# Patient Record
Sex: FEMALE | Race: OTHER | HISPANIC OR LATINO | URBAN - METROPOLITAN AREA
[De-identification: names, ages, dates, MRNs, and addresses within clinical notes are randomized per-mention and may not be internally consistent; named-entity substitution may affect disease eponyms.]

---

## 2024-07-23 ENCOUNTER — EMERGENCY (EMERGENCY)
Facility: HOSPITAL | Age: 34
LOS: 1 days | Discharge: HOME | End: 2024-07-23
Attending: EMERGENCY MEDICINE | Admitting: EMERGENCY MEDICINE
Payer: COMMERCIAL

## 2024-07-23 VITALS
SYSTOLIC BLOOD PRESSURE: 145 MMHG | HEART RATE: 89 BPM | TEMPERATURE: 98 F | RESPIRATION RATE: 16 BRPM | OXYGEN SATURATION: 99 % | DIASTOLIC BLOOD PRESSURE: 82 MMHG

## 2024-07-23 VITALS
HEIGHT: 55 IN | DIASTOLIC BLOOD PRESSURE: 81 MMHG | TEMPERATURE: 99 F | WEIGHT: 122.58 LBS | RESPIRATION RATE: 16 BRPM | SYSTOLIC BLOOD PRESSURE: 122 MMHG | OXYGEN SATURATION: 99 % | HEART RATE: 95 BPM

## 2024-07-23 LAB — HCG UR QL: POSITIVE

## 2024-07-23 PROCEDURE — 71046 X-RAY EXAM CHEST 2 VIEWS: CPT

## 2024-07-23 PROCEDURE — 71046 X-RAY EXAM CHEST 2 VIEWS: CPT | Mod: 26

## 2024-07-23 PROCEDURE — 99284 EMERGENCY DEPT VISIT MOD MDM: CPT

## 2024-07-23 PROCEDURE — 99283 EMERGENCY DEPT VISIT LOW MDM: CPT | Mod: 25

## 2024-07-23 PROCEDURE — 81025 URINE PREGNANCY TEST: CPT

## 2024-07-23 RX ORDER — ACETAMINOPHEN 325 MG
1000 TABLET ORAL ONCE
Refills: 0 | Status: COMPLETED | OUTPATIENT
Start: 2024-07-23 | End: 2024-07-23

## 2024-07-23 RX ORDER — DIAZEPAM 10 MG/1
5 TABLET ORAL ONCE
Refills: 0 | Status: DISCONTINUED | OUTPATIENT
Start: 2024-07-23 | End: 2024-07-24

## 2024-07-23 RX ORDER — LIDOCAINE HCL 28 MG/G
1 GEL TOPICAL ONCE
Refills: 0 | Status: COMPLETED | OUTPATIENT
Start: 2024-07-23 | End: 2024-07-23

## 2024-07-23 RX ORDER — KETOROLAC TROMETHAMINE 30 MG/ML
30 INJECTION, SOLUTION INTRAMUSCULAR ONCE
Refills: 0 | Status: DISCONTINUED | OUTPATIENT
Start: 2024-07-23 | End: 2024-07-24

## 2024-07-23 RX ADMIN — LIDOCAINE HCL 1 PATCH: 28 GEL TOPICAL at 22:21

## 2024-07-23 RX ADMIN — Medication 1000 MILLIGRAM(S): at 22:30

## 2024-07-23 RX ADMIN — Medication 1000 MILLIGRAM(S): at 23:30

## 2024-07-23 NOTE — ED PROVIDER NOTE - PATIENT PORTAL LINK FT
You can access the FollowMyHealth Patient Portal offered by [facility] by registering at the following website: [email or web address]. By joining [employer]’s FollowMyHealth portal, you will also be able to view your health information using other applications (apps) compatible with our system.

## 2024-07-23 NOTE — ED PROVIDER NOTE - CONSTITUTIONAL, MLM
normal... Well appearing, awake, alert, oriented to person, place, time/situation and in no apparent distress. Uncomfortable with movement

## 2024-07-23 NOTE — ED ADULT NURSE NOTE - CHIEF COMPLAINT QUOTE
I have left side back pain from my neck to my lower back since 5pm and I have fever; pt took ibuprofen 400mg at 1930 with no relief

## 2024-07-23 NOTE — ED ADULT TRIAGE NOTE - CHIEF COMPLAINT QUOTE
I have left side back pain from my neck to my lower back since 5pm and I have fever; pt took ibuprofen 400mg at 1930 with no relief Lymphatic malformation

## 2024-07-23 NOTE — ED ADULT TRIAGE NOTE - TEMP AT ED ARRIVAL (C)
37.2 Erythromycin Pregnancy And Lactation Text: This medication is Pregnancy Category B and is considered safe during pregnancy. It is also excreted in breast milk.

## 2024-07-23 NOTE — ED ADULT NURSE NOTE - NSFALLUNIVINTERV_ED_ALL_ED
Bed/Stretcher in lowest position, wheels locked, appropriate side rails in place/Call bell, personal items and telephone in reach/Instruct patient to call for assistance before getting out of bed/chair/stretcher/Non-slip footwear applied when patient is off stretcher/[place] to call system/Physically safe environment - no spills, clutter or unnecessary equipment/Purposeful proactive rounding/Room/bathroom lighting operational, light cord in reach

## 2024-07-23 NOTE — ED ADULT NURSE NOTE - PLAN OF CARE
EMS Transport Request  For use at [facility], [place], [place], and [place] only   Patient Name: Mitzi Eric : 1936   Weight:50.4 kg (111 lb 3.2 oz) Pick-up Location: [facility] BLS/ALS: bls   Insurance: [employer] MEDICARE REPLACEMENT Auth End Date: 2023   Pre-Cert #: yes D/C Summary complete: no   Destination: [place]   Contact Precautions: none   Equipment (O2, Fluids, etc.): 3 liters   Arrive By Date/Time: 2023 Stretcher/WC: stretcher   CM Requesting: YURI Joseph Ext: [id number]   Notes/Medical Necessity: weakness  dementia weakness     ______________________________________________________________________    *Only 2 patient bags OR 1 carry-on size bag are permitted.  Wheelchairs and walkers CANNOT transported with the patient. Acknowledge: yes    
Call bell/Explanation of exam/test/Position of comfort

## 2024-07-23 NOTE — ED PROVIDER NOTE - OBJECTIVE STATEMENT
Patient presents with complaints of pain in her left upper back radiating down to her left mid back.  Patient states the pain started a few hours ago while going to lie down.  Patient take 2 ibuprofen approximately 2 and half hours ago.  Patient felt warm but did not check her temperature.  No cough.  No difficulty breathing.  Pain worse with movement.  Worse when she raises her left arm or turns her body.  No sore throat or runny nose.  No abdominal pain.  No vomiting or diarrhea.  No urinary complaints.

## 2024-07-23 NOTE — ED PROVIDER NOTE - CLINICAL SUMMARY MEDICAL DECISION MAKING FREE TEXT BOX
Patient with back pain and upper back that started while going to lie down.  Pain suggestive of musculoskeletal origin.  Will treat pain and get chest x-ray.  If patient improves will refer to Ortho.

## 2024-07-23 NOTE — ED PROVIDER NOTE - CARE PROVIDER_API CALL
Maria D Quintero  Obstetrics and Gynecology  [address] [place] [id number]  Phone: [phone]  Fax: [phone]  Follow Up Time: 1-3 Days

## 2024-07-23 NOTE — ED ADULT NURSE NOTE - OBJECTIVE STATEMENT
34yr old female walked into ED c/o left side posterior neck pain radiating down left side of back; denies urinary symptoms denies injury; pain increases with movement 34yr old female walked into ED c/o left side posterior neck pain radiating down left side of back; denies urinary symptoms denies injury; pain increases with movement; LMP 06/18/2024

## 2024-07-23 NOTE — ED PROVIDER NOTE - NSFOLLOWUPINSTRUCTIONS_ED_ALL_ED_FT
Dolor muscular en los adultos  Muscle Pain, Adult  El dolor muscular, también llamado mialgia, es bladimir afección que causa dolor en dae o más músculos del cuerpo. El dolor puede ser leve, moderado o intenso. Puede ser jeet, bladimir molestia continua o bladimir sensación de ardor. La mayoría de las veces, el dolor dura solo un corto período y desaparece por sí solo.    Es normal sentir algo de dolor muscular después de comenzar un nuevo programa de ejercicios. Los músculos que no se guerra usado mucho dolerán al principio.    ¿Cuáles son las causas?  Puede tener dolor muscular cuando usa los músculos de bladimir manera nueva o diferente después de no haberlos usado adrian algún tiempo. El dolor muscular también puede deberse al uso excesivo o al estiramiento de un músculo más allá de menjivar longitud normal (distensión muscular). Es más probable que tenga dolor muscular si no está en forma.    Algunas otras causas son las siguientes:  Lesiones o moretones.  Enfermedades infecciosas. Estas incluyen enfermedades causadas por virus, maricruz la gripe (influenza).  Fibromialgia. Es bladimir afección a taryn plazo (crónica) que causa sensibilidad muscular, cansancio (fatiga) y dolor de chris.  Enfermedades autoinmunes o reumatológicas. Son afecciones, maricruz el lupus, que hacen que el sistema de defensa del cuerpo (sistema inmunitario) ataque zonas del cuerpo.  Ciertos medicamentos. Estos incluyen los inhibidores de la enzima convertidora de angiotensina (IECA) y estatinas.  ¿Cuáles son los signos o síntomas?  El síntoma principal son los músculos inflamados o doloridos. Los músculos pueden estar doloridos cuando usted realiza actividades y cuando se estira. También puede duke bladimir leve hinchazón.    ¿Cómo se diagnostica?  El dolor muscular se diagnostica mediante un examen físico. El médico le hará preguntas acerca del dolor y cuándo comenzó a sentirlo.  Si no ha tenido dolor muscular adrian mucho tiempo, el médico puede esperar antes de hacer diversas pruebas.  Si el dolor muscular ha durado mucho tiempo, se pueden realizar pruebas de inmediato.  En algunos casos, es posible que necesite pruebas para descartar otras afecciones y enfermedades.  ¿Cómo se trata?  El tratamiento del dolor muscular depende de la causa. El cuidado en casa a menudo es suficiente para aliviar el dolor. El médico también puede recetarle antiinflamatorios no esteroideos (DK), maricruz ibuprofeno.    Siga estas instrucciones en menjivar casa:  Medicamentos    Use los medicamentos de venta jessica y los recetados solamente maricruz se lo haya indicado el médico.  Pregúntele al médico si el medicamento recetado le impide conducir o usar maquinaria.  Manejo del dolor, la hinchazón y las molestias    Bag of ice on a towel on the skin.  A heating pad for use on the affected area.  Si se lo indican, aplique hielo en la michelle dolorida adrian los primeros 2 días de dolor.  Ponga el hielo en bladimir bolsa plástica.  Coloque bladimir toalla entre la piel y la bolsa.  Aplique el hielo adrian 20 minutos, 2 a 3 veces por día.  Si la piel se le pone de color diaz brillante, retire el hielo de inmediato para evitar daños en la piel. El riesgo de daño es mayor si no puede sentir dolor, calor o frío.  Adrian los primeros 2 días de dolor muscular, o si hay hinchazón:  No se dé domingo calientes.  No use el jacuzzi, baño khai, sauna, almohadillas térmicas ni ninguna otra axel de calor.  Después de 2 o 3 días, puede cambiar entre aplicar hielo y calor en la michelle. Si se lo indican, aplique calor en la michelle afectada con la frecuencia que le haya dicho el médico. Use la axel de calor que el médico le recomiende, maricruz bladimir compresa de calor húmedo o bladimir almohadilla térmica.  Coloque bladimir toalla entre la piel y la axel de calor.  Aplique calor adrian 20 a 30 minutos.  Si la piel se le pone de color diaz brillante, retire el hielo o el calor de inmediato para evitar daños en la piel. El riesgo de daño es mayor si no puede sentir dolor, calor o frío.  Si tiene bladimir lesión, levante (eleve) la michelle lesionada por encima del nivel del corazón mientras esté sentado o acostado.  Actividad    A person standing with arms stretched straight out in front and then squatting while keeping the knees over the toes.  Si el dolor muscular se debe al uso excesivo, tracy lo siguiente:  Disminuya steffanie actividades hasta que el dolor desaparezca.  Realice ejercicios suaves y regulares si normalmente no hace actividad física.  Entre en calor antes de hacer ejercicio. Elongue antes y después de hacer el ejercicio. Gutierrez puede ayudar a disminuir el riesgo de que sufra dolor muscular.  No continúe haciendo actividad física si el dolor es intenso. El dolor intenso podría indicar que se ha lesionado un músculo.  Es posible que deba evitar levantar objetos. Pregúntele al médico cuánto peso puede levantar sin correr riesgos.  Retome steffanie actividades normales maricruz se lo haya indicado el médico. Pregúntele al médico qué actividades son seguras para usted.  Instrucciones generales    No consuma ningún producto que contenga nicotina o tabaco. Estos productos incluyen cigarrillos, tabaco para mascar y aparatos de vapeo, maricruz los cigarrillos electrónicos. Si necesita ayuda para dejar de fumar, consulte al médico.  Comuníquese con un médico si:  El dolor muscular empeora y los medicamentos no surten efecto.  El dolor muscular dura más de 3 días.  Tiene bladimir erupción cutánea o fiebre.  Tiene dolor muscular después de bladimir picadura de garrapata.  Tiene dolor muscular al hacer ejercicio, aunque esté en buena forma.  Tiene enrojecimiento dolor o hinchazón.  Tiene dolor muscular después de comenzar un medicamento nuevo o de cambiar la dosis de un medicamento.  Solicite ayuda de inmediato si:  Tiene dificultad para respirar.  Tiene dificultad para tragar.  Tiene dolor muscular junto con rigidez en el efra, fiebre y vómitos.  Tiene debilidad muscular intensa o no puede  bladimir parte del cuerpo.  Orina menos o tiene orina oscura, con sammy o de color diferente.  Tiene enrojecimiento o hinchazón en el lugar del dolor muscular.  Estos síntomas pueden indicar bladimir emergencia. Solicite ayuda de inmediato. Llame al 911.  No espere a silvia si los síntomas desaparecen.  No conduzca por steffanie propios medios hasta el hospital.  Esta información no tiene maricruz fin reemplazar el consejo del médico. Asegúrese de hacerle al médico cualquier pregunta que tenga.

## 2024-08-27 PROBLEM — Z78.9 OTHER SPECIFIED HEALTH STATUS: Chronic | Status: ACTIVE | Noted: 2024-07-23

## 2024-11-03 ENCOUNTER — NON-APPOINTMENT (OUTPATIENT)
Age: 34
End: 2024-11-03

## 2024-11-06 PROBLEM — Z00.00 ENCOUNTER FOR PREVENTIVE HEALTH EXAMINATION: Status: ACTIVE | Noted: 2024-11-06

## 2024-11-08 ENCOUNTER — APPOINTMENT (OUTPATIENT)
Dept: ORTHOPEDIC SURGERY | Facility: CLINIC | Age: 34
End: 2024-11-08

## 2024-12-08 ENCOUNTER — EMERGENCY (EMERGENCY)
Facility: HOSPITAL | Age: 34
LOS: 1 days | Discharge: HOME | End: 2024-12-08
Attending: EMERGENCY MEDICINE | Admitting: EMERGENCY MEDICINE
Payer: COMMERCIAL

## 2024-12-08 VITALS
TEMPERATURE: 98 F | RESPIRATION RATE: 20 BRPM | SYSTOLIC BLOOD PRESSURE: 118 MMHG | HEIGHT: 55 IN | WEIGHT: 127.87 LBS | HEART RATE: 119 BPM | OXYGEN SATURATION: 98 % | DIASTOLIC BLOOD PRESSURE: 75 MMHG

## 2024-12-08 PROCEDURE — 99283 EMERGENCY DEPT VISIT LOW MDM: CPT

## 2024-12-08 PROCEDURE — 99284 EMERGENCY DEPT VISIT MOD MDM: CPT | Mod: 25

## 2024-12-08 RX ORDER — ACETAMINOPHEN 500MG 500 MG/1
650 TABLET, COATED ORAL ONCE
Refills: 0 | Status: COMPLETED | OUTPATIENT
Start: 2024-12-08 | End: 2024-12-08

## 2024-12-08 RX ORDER — ONDANSETRON HYDROCHLORIDE 4 MG/1
4 TABLET, FILM COATED ORAL ONCE
Refills: 0 | Status: COMPLETED | OUTPATIENT
Start: 2024-12-08 | End: 2024-12-08

## 2024-12-08 RX ADMIN — ONDANSETRON HYDROCHLORIDE 4 MILLIGRAM(S): 4 TABLET, FILM COATED ORAL at 23:09

## 2024-12-08 RX ADMIN — ACETAMINOPHEN 500MG 650 MILLIGRAM(S): 500 TABLET, COATED ORAL at 23:08

## 2024-12-08 NOTE — ED PROVIDER NOTE - OBJECTIVE STATEMENT
34y F c/o n/v/d, pt is 20+ wks pregnant (unsure of dates), reports having diarrhea and vomiting, multiple episodes, started last night, neice with the same, pt also has some left sided neck pain, no fever/chills, hasn't taken anything for nausea, took tylenol yesterday once

## 2024-12-08 NOTE — ED PROVIDER NOTE - PROVIDER TOKENS
FREE:[LAST:[Follow up with your obstetrician],PHONE:[(   )    -],FAX:[(   )    -],FOLLOWUP:[1-3 Days]]

## 2024-12-08 NOTE — ED PROVIDER NOTE - PATIENT PORTAL LINK FT
You can access the FollowMyHealth Patient Portal offered by [facility] by registering at the following website: [email or web address]. By joining [employer]’s FollowMyHealth portal, you will also be able to view your health information using other applications (apps) compatible with our system.

## 2024-12-08 NOTE — ED PROVIDER NOTE - NSFOLLOWUPINSTRUCTIONS_ED_ALL_ED_FT
Gastroenteritis    LO QUE NECESITA SABER:    La gastroenteritis, o gripe estomacal, es bladimir infección del estómago y los intestinos. Las causas pueden ser bacterias, parásitos, virus, sustancias químicas o reacciones a medicamentos.  Tracto digestivo    INSTRUCCIONES SOBRE EL MOISES HOSPITALARIA:    Busque atención médica de inmediato si:    Ve sammy en menjivar vómito o diarrea.    Usted no puede dejar de vomitar.    Usted tiene dolor estomacal grave o menjivar abdomen está hinchado.    Usted no ha orinado en 12 horas.    Usted siente que se va a desmayar.  Llame a menjivar médico si:    Usted tiene fiebre que no desaparece.    Usted continúa con vómitos o diarrea aún después del tratamiento.    Usted ve lombrices en menjivar diarrea.    Menjivar boca u ojos están secos. Usted no está orinando tanto o con la misma frecuencia.    Usted tiene preguntas o inquietudes acerca de menjivar condición o cuidado.  Medicamentos:    Los medicamentosse pueden administrar para detener los vómitos o la diarrea, disminuir los calambres abdominales o tratar bladimir infección.    [place] steffanie medicamentos maricruz se le haya indicado.Consulte con menjivar médico si usted shayan que menjivar medicamento no le está ayudando o si presenta efectos secundarios. Infórmele al médico si usted es alérgico a algún medicamento. Mantenga bladimir lista actualizada de los medicamentos, las vitaminas y los productos herbales que peter. Incluya los siguientes datos de los medicamentos: cantidad, frecuencia y motivo de administración. Traiga con usted la lista o los envases de las píldoras a steffanie citas de seguimiento. Lleve la lista de los medicamentos con usted en cristy de bladimir emergencia.  El manejo de steffanie síntomas:    [place] líquidos maricruz se le haya indicado.Pregunte a menjivar médico qué cantidad de líquido debe beber a diario y qué líquidos le recomienda. Es posible que también necesite jagdish bladimir solución de rehidratación oral (SRO). Bladimir SRO contiene la cantidad adecuada de azúcar, sal y minerales en agua para reponer los líquidos corporales.    Consuma alimentos blandos.Cuando usted sienta hambre, empiece a comer alimentos suaves y blandos. Ejemplos son los plátanos, sopas claras, tex y puré de manzana. No consuma productos lácteos, alcohol, bebidas azucaradas, o bebidas con cafeína hasta que se sienta mejor. Evite comer comidas rápidas o ricas en grasas.    Coma comidas pequeñas frecuentemente michael el día.Menjivar estómago puede tolerar comidas pequeñas cada 2 o 3 horas en lugar de 3 comidas grandes.    Descanse el mayor tiempo posible.Cuando se empiece a sentir mejor, comience poco a poco a hacer más cada día.  Evite la propagación de la gastroenteritis:La gastroenteritis se puede propagar fácilmente. Manténgase usted, menjivar jannie y steffanie alrededores limpios para ayudar a evitar la propagación de la gastroenteritis:    Lávese las kely frecuentemente.Utilice agua y jabón. Lávese las kely después de usar el baño, cambiarle el pañal a un kim o estornudar. Lávese las kely antes de comer o preparar alimentos.  Lavado de kely      Limpie las superficies y lave la ropa con frecuencia.Lave menjivar ropa y steffanie toallas por separado del skye de la ropa. Limpie las superficies de menjivar hogar con limpiador antibacterial o con blanqueador.    Lave y cocine swapna los alimentos.Lave las verduras crudas antes de cocinar. Cocine swapna las brisa, pescados y huevos. No utilice los mismos platos para las brisa crudas que para otros alimentos. Ponga en el refrigerador inmediatamente cualquier alimento que haya sobrado.    Esté alerta cuando usted vaya de campamento o cuando viaje.Solamente tome agua limpia. No tome agua de los toro o humera a menos que usted purifique o hierva el agua norma. Cuando viaje, tome agua embotellada y no le ponga hielo. No coma fruta con la cáscara. No coma pescado crudo o brisa que no están cocinadas completamente.  Acuda a la consulta de control con menjivar médico según las indicaciones:Anote steffanie preguntas para que se acuerde de hacerlas michael steffanie visitas.

## 2024-12-08 NOTE — ED PROVIDER NOTE - CLINICAL SUMMARY MEDICAL DECISION MAKING FREE TEXT BOX
34y F 2nd trimester pregnancy, c/o n/v/d, family with same, will give zofran, check fetal HR, po challenge

## 2024-12-09 VITALS
OXYGEN SATURATION: 100 % | RESPIRATION RATE: 16 BRPM | DIASTOLIC BLOOD PRESSURE: 75 MMHG | HEART RATE: 105 BPM | TEMPERATURE: 98 F | SYSTOLIC BLOOD PRESSURE: 120 MMHG

## 2024-12-09 RX ORDER — ONDANSETRON HYDROCHLORIDE 4 MG/1
1 TABLET, FILM COATED ORAL
Qty: 1 | Refills: 0
Start: 2024-12-09
